# Patient Record
Sex: FEMALE | Race: WHITE | NOT HISPANIC OR LATINO | Employment: OTHER | URBAN - METROPOLITAN AREA
[De-identification: names, ages, dates, MRNs, and addresses within clinical notes are randomized per-mention and may not be internally consistent; named-entity substitution may affect disease eponyms.]

---

## 2017-01-30 ENCOUNTER — HOSPITAL ENCOUNTER (OUTPATIENT)
Dept: SLEEP MEDICINE | Facility: HOSPITAL | Age: 62
Discharge: HOME | End: 2017-01-30
Admitting: INTERNAL MEDICINE

## 2017-01-30 PROCEDURE — G0463 HOSPITAL OUTPT CLINIC VISIT: HCPCS

## 2017-01-31 NOTE — PROGRESS NOTES
DATE OF VISIT: 01/30/2017    REFERRING PHYSICIANS:  1. Marisela Pemberton MD  2. Donny Lubin MD    HISTORY OF PRESENT ILLNESS: This is a very pleasant 61-year-old female last seen in 2012 by Dr. Ewing. At that time, the patient had a negative sleep study. She is here because she feels her snoring has been worse for the past few years. She has been having more arousals and feeling unrested and tired in the morning. She has had in the past history of night terrors, which has now resolved pretty much. No parasomnias reported. She does feel tired, unrested and sleepy during the daytime. No sweating in her sleep. Occasional headaches. She also reports difficulty staying asleep at night with frequent awakenings. Sleep schedule: Time to bed at 9:30 to 10, gets up at 7, gets about 7 hours of sleep and feels sluggish. No alcohol abuse. No caffeine abuse.     PAST MEDICAL HISTORY:  1. Hypothyroidism.  2. Hypertension.     MEDICATIONS:  1. Prempro.  2. Synthroid.  3. Hypertension medication.     FAMILY HISTORY: Positive for breast cancer  and COPD.     SOCIAL HISTORY: Retired teacher. No tobacco abuse. Alcohol: One drink daily. Caffeinated beverages are 3 drinks daily.     REVIEW OF SYSTEMS: Positive for nasal congestion, postnasal drainage, irregular heartbeat, cough, dizziness, and depression. The rest of the 12-point review of systems is negative. [place] Sleepiness Scale Score is 9 out of 24, within normal limits.     PHYSICAL EXAMINATION:  GENERAL: Awake, alert, no distress.   VITAL SIGNS: Blood pressure 137/91, BMI 28, heart rate 65.   HEENT: Mallampati between III and IV.   LUNGS: Clear.   CARDIOVASCULAR: Regular rate and rhythm.   ABDOMEN: Benign.   EXTREMITIES: No edema.   CNS: No deficits.     IMPRESSION: Hypersomnia with comorbidities of hypertension.     RECOMMENDATIONS: I reviewed old sleep study with the patient. She had a low index with no significant desaturation at this time with worsening symptoms.  Recommend sleep study to reevaluate. Re-educated the patient on sleep apnea and sleep hygiene measures. The patient is agreeable, wishing to proceed for a split night sleep study. Sleep hygiene was discussed in detail. We will follow up post sleep study, discuss results and further management options.          Rony Fonseca M.D.  TS:[email or web address]  D:   01/30/2017 14:55:53  T:   01/31/2017 03:55:00  Job ID:   [id number]  Document ID:   [id number]  cc:   SARAH Celestin M.D.

## 2017-02-02 ENCOUNTER — TRANSCRIBE ORDERS (OUTPATIENT)
Dept: PULMONOLOGY | Facility: HOSPITAL | Age: 62
End: 2017-02-02

## 2017-02-02 DIAGNOSIS — G47.33 OSA (OBSTRUCTIVE SLEEP APNEA): Primary | ICD-10-CM

## 2017-02-03 ENCOUNTER — HOSPITAL ENCOUNTER (OUTPATIENT)
Dept: SLEEP MEDICINE | Facility: HOSPITAL | Age: 62
Discharge: HOME | End: 2017-02-03
Admitting: INTERNAL MEDICINE

## 2017-02-03 DIAGNOSIS — G47.33 OSA (OBSTRUCTIVE SLEEP APNEA): ICD-10-CM

## 2017-02-03 PROCEDURE — 95806 SLEEP STUDY UNATT&RESP EFFT: CPT

## 2017-02-20 ENCOUNTER — TELEPHONE (OUTPATIENT)
Dept: SLEEP MEDICINE | Facility: HOSPITAL | Age: 62
End: 2017-02-20

## 2017-02-20 NOTE — TELEPHONE ENCOUNTER
Spoke with pt.  Pap titration cx as pt's ins will not pay for in lab titration study.  Request for auto device placed in dr. sosa's to do pile.  Order to be faxed to [facility].

## 2017-02-21 ENCOUNTER — TELEPHONE (OUTPATIENT)
Dept: SLEEP MEDICINE | Facility: HOSPITAL | Age: 62
End: 2017-02-21

## 2017-02-21 NOTE — TELEPHONE ENCOUNTER
Spoke with pt.  Let pt know that  i requested  write the order for s/u from [facility].  Emailed que @ [facility] this morning.    Pt just called [facility] and requested to get dr. sosa to just write the order, she's wanting to purchase her device herself(not from a dme).    Pt states she's going to call [facility] to get an order from dr. sosa.

## 2017-02-22 ENCOUNTER — TELEPHONE (OUTPATIENT)
Dept: SLEEP MEDICINE | Facility: HOSPITAL | Age: 62
End: 2017-02-22

## 2017-02-22 NOTE — TELEPHONE ENCOUNTER
Spoke with pt.  Per pt she's going to  an order from [facility] on Monday(2/27/16).

## 2017-06-08 ENCOUNTER — APPOINTMENT (OUTPATIENT)
Dept: WOMENS IMAGING | Facility: HOSPITAL | Age: 62
End: 2017-06-08

## 2017-06-08 PROCEDURE — 77062 BREAST TOMOSYNTHESIS BI: CPT | Performed by: RADIOLOGY

## 2017-06-08 PROCEDURE — 77066 DX MAMMO INCL CAD BI: CPT | Performed by: RADIOLOGY

## 2017-06-15 ENCOUNTER — APPOINTMENT (OUTPATIENT)
Dept: WOMENS IMAGING | Facility: HOSPITAL | Age: 62
End: 2017-06-15

## 2017-06-15 PROCEDURE — 76641 ULTRASOUND BREAST COMPLETE: CPT | Performed by: RADIOLOGY

## 2018-08-10 ENCOUNTER — APPOINTMENT (OUTPATIENT)
Dept: WOMENS IMAGING | Facility: HOSPITAL | Age: 63
End: 2018-08-10

## 2018-08-10 PROCEDURE — 77067 SCR MAMMO BI INCL CAD: CPT | Performed by: RADIOLOGY

## 2018-08-10 PROCEDURE — 77063 BREAST TOMOSYNTHESIS BI: CPT | Performed by: RADIOLOGY

## 2019-08-15 ENCOUNTER — APPOINTMENT (OUTPATIENT)
Dept: WOMENS IMAGING | Facility: HOSPITAL | Age: 64
End: 2019-08-15

## 2019-08-15 PROCEDURE — 77063 BREAST TOMOSYNTHESIS BI: CPT | Performed by: RADIOLOGY

## 2019-08-15 PROCEDURE — 77067 SCR MAMMO BI INCL CAD: CPT | Performed by: RADIOLOGY

## 2020-11-17 ENCOUNTER — APPOINTMENT (OUTPATIENT)
Dept: WOMENS IMAGING | Facility: HOSPITAL | Age: 65
End: 2020-11-17

## 2020-11-17 PROCEDURE — 77067 SCR MAMMO BI INCL CAD: CPT | Performed by: RADIOLOGY

## 2020-11-17 PROCEDURE — 77063 BREAST TOMOSYNTHESIS BI: CPT | Performed by: RADIOLOGY

## 2021-07-22 ENCOUNTER — TRANSCRIBE ORDERS (OUTPATIENT)
Dept: ADMINISTRATIVE | Facility: HOSPITAL | Age: 66
End: 2021-07-22

## 2021-07-22 DIAGNOSIS — Z80.3 FAMILY HISTORY OF BREAST CANCER: Primary | ICD-10-CM

## 2021-08-18 ENCOUNTER — HOSPITAL ENCOUNTER (OUTPATIENT)
Dept: MRI IMAGING | Facility: HOSPITAL | Age: 66
Discharge: HOME | End: 2021-08-18
Admitting: OBSTETRICS & GYNECOLOGY

## 2021-08-18 DIAGNOSIS — Z80.3 FAMILY HISTORY OF BREAST CANCER: ICD-10-CM

## 2021-08-18 PROCEDURE — 82565 ASSAY OF CREATININE: CPT

## 2021-08-18 PROCEDURE — C8937 CAD BREAST MRI: HCPCS

## 2021-08-18 PROCEDURE — A9577 INJ MULTIHANCE: HCPCS | Performed by: OBSTETRICS & GYNECOLOGY

## 2021-08-18 PROCEDURE — C8908 MRI W/O FOL W/CONT, BREAST,: HCPCS

## 2021-08-18 PROCEDURE — 0 GADOBENATE DIMEGLUMINE 529 MG/ML SOLUTION: Performed by: OBSTETRICS & GYNECOLOGY

## 2021-08-18 RX ADMIN — GADOBENATE DIMEGLUMINE 13 ML: 529 INJECTION, SOLUTION INTRAVENOUS at 16:57

## 2021-08-20 ENCOUNTER — TRANSCRIBE ORDERS (OUTPATIENT)
Dept: ADMINISTRATIVE | Facility: HOSPITAL | Age: 66
End: 2021-08-20

## 2021-08-20 DIAGNOSIS — R92.8 ABNORMAL MRI, BREAST: Primary | ICD-10-CM

## 2021-08-20 LAB — CREAT BLDA-MCNC: 0.7 MG/DL (ref 0.6–1.3)

## 2021-08-25 ENCOUNTER — HOSPITAL ENCOUNTER (OUTPATIENT)
Dept: MAMMOGRAPHY | Facility: HOSPITAL | Age: 66
Discharge: HOME | End: 2021-08-25
Admitting: OBSTETRICS & GYNECOLOGY

## 2021-08-25 DIAGNOSIS — R92.8 ABNORMAL MRI, BREAST: ICD-10-CM

## 2021-08-25 PROCEDURE — G0279 TOMOSYNTHESIS, MAMMO: HCPCS

## 2021-08-25 PROCEDURE — 77065 DX MAMMO INCL CAD UNI: CPT

## 2021-09-09 ENCOUNTER — HOSPITAL ENCOUNTER (OUTPATIENT)
Dept: MAMMOGRAPHY | Facility: HOSPITAL | Age: 66
Discharge: HOME | End: 2021-09-09
Admitting: OBSTETRICS & GYNECOLOGY

## 2021-09-09 VITALS
BODY MASS INDEX: 29.23 KG/M2 | OXYGEN SATURATION: 98 % | WEIGHT: 145 LBS | HEIGHT: 59 IN | HEART RATE: 88 BPM | RESPIRATION RATE: 16 BRPM | DIASTOLIC BLOOD PRESSURE: 76 MMHG | TEMPERATURE: 97.8 F | SYSTOLIC BLOOD PRESSURE: 123 MMHG

## 2021-09-09 DIAGNOSIS — R92.8 ABNORMAL MAMMOGRAM: ICD-10-CM

## 2021-09-09 PROCEDURE — 25010000003 LIDOCAINE 1 % SOLUTION: Performed by: OBSTETRICS & GYNECOLOGY

## 2021-09-09 PROCEDURE — 88305 TISSUE EXAM BY PATHOLOGIST: CPT | Performed by: OBSTETRICS & GYNECOLOGY

## 2021-09-09 RX ORDER — DIAZEPAM 5 MG/1
5 TABLET ORAL ONCE AS NEEDED
Status: SHIPPED | OUTPATIENT
Start: 2021-09-09

## 2021-09-09 RX ORDER — LIDOCAINE HYDROCHLORIDE 10 MG/ML
27 INJECTION, SOLUTION INFILTRATION; PERINEURAL ONCE
Status: COMPLETED | OUTPATIENT
Start: 2021-09-09 | End: 2021-09-09

## 2021-09-09 RX ORDER — VILAZODONE HYDROCHLORIDE 20 MG/1
20 TABLET ORAL DAILY
Status: ON HOLD | COMMUNITY
End: 2022-06-13

## 2021-09-09 RX ADMIN — LIDOCAINE HYDROCHLORIDE 27 ML: 10; .005 INJECTION, SOLUTION EPIDURAL; INFILTRATION; INTRACAUDAL; PERINEURAL at 13:37

## 2021-09-09 RX ADMIN — LIDOCAINE HYDROCHLORIDE 27 ML: 10 INJECTION, SOLUTION INFILTRATION; PERINEURAL at 13:28

## 2021-09-09 RX ADMIN — LIDOCAINE HYDROCHLORIDE 1 ML: 10 INJECTION, SOLUTION INFILTRATION; PERINEURAL at 13:36

## 2021-09-09 NOTE — NURSING NOTE
Biopsy site to left medial upper breast clear with Dermabond dry and intact. No firmness or swelling noted at or around biopsy site. Denies pain but reports slight stinging sensation. Ice pack with protective covering applied to biopsy site. Discharge instructions discussed with understanding voiced by patient. Copies provided to patient. No distress noted. To home via private vehicle.

## 2021-09-09 NOTE — H&P
Name: Stephie Gerber ADMIT: (Not on file)   : 1955  PCP: Pauline Sarabia DO    MRN: [id number] LOS: 0 days   AGE/SEX: 65 y.o. female  ROOM: Room/bed info not found       Chief complaint left breast lesion on MRI with a mammographic correlate    Present Illness or Internal History:  Patient is a 65 y.o. female presents with a left breast lesion on MRI with a mammographic correlate.     Past Surgical History:  Past Surgical History:   Procedure Laterality Date   • APPENDECTOMY     • AUGMENTATION MAMMAPLASTY     • CHEST TUBE INSERTION     • COLONOSCOPY         Past Medical History:  Past Medical History:   Diagnosis Date   • Benign essential hypertension    • Hearing loss, bilateral    • Hyperlipidemia    • Hypothyroidism    • Moderate mitral regurgitation    • Seasonal allergic rhinitis    • Sleep apnea    • Spontaneous pneumothorax    • Weller syndrome    • Vertigo        Home Medications:  (Not in a hospital admission)      Allergies:  Neomycin-bacitracin zn-polymyx    Family History:  Family History   Problem Relation Age of Onset   • Breast cancer Mother    • Colon polyps Mother    • Heart attack Father    • Prostate cancer Father    • Liver cancer Brother    • Colon polyps Brother    • Colon polyps Brother    • Colon polyps Brother    • Colon polyps Brother    • Colon polyps Brother        Social History:  Social History     Tobacco Use   • Smoking status: Not on file   Substance Use Topics   • Alcohol use: Not on file   • Drug use: Not on file        Objective     Physical Exam:    No exam performed today,    Vital Signs  BP: ()/()   Arterial Line BP: ()/()     Anticipated Surgical Procedure:  tomosynthesis guided vacuum assisted left breast biopsy with clip placement    The risks, benefits and alternatives of this procedure have been discussed with the patient or responsible party: Yes        Hardy Zambrano Jr., MD  21  12:22 EDT

## 2021-09-10 LAB
CYTO UR: NORMAL
LAB AP CASE REPORT: NORMAL
LAB AP DIAGNOSIS COMMENT: NORMAL
PATH REPORT.FINAL DX SPEC: NORMAL
PATH REPORT.GROSS SPEC: NORMAL

## 2021-09-13 ENCOUNTER — HOSPITAL ENCOUNTER (OUTPATIENT)
Dept: MAMMOGRAPHY | Facility: HOSPITAL | Age: 66
End: 2021-09-13

## 2021-11-18 ENCOUNTER — TRANSCRIBE ORDERS (OUTPATIENT)
Dept: ADMINISTRATIVE | Facility: HOSPITAL | Age: 66
End: 2021-11-18

## 2021-11-18 DIAGNOSIS — N64.4 BREAST PAIN, RIGHT: Primary | ICD-10-CM

## 2021-11-18 DIAGNOSIS — N64.4 PAIN OF RIGHT BREAST: Primary | ICD-10-CM

## 2021-12-09 ENCOUNTER — HOSPITAL ENCOUNTER (OUTPATIENT)
Dept: ULTRASOUND IMAGING | Facility: HOSPITAL | Age: 66
Discharge: HOME | End: 2021-12-09

## 2021-12-09 ENCOUNTER — HOSPITAL ENCOUNTER (OUTPATIENT)
Dept: MAMMOGRAPHY | Facility: HOSPITAL | Age: 66
Discharge: HOME | End: 2021-12-09

## 2021-12-09 DIAGNOSIS — N64.4 PAIN OF RIGHT BREAST: ICD-10-CM

## 2021-12-09 DIAGNOSIS — N64.4 BREAST PAIN, RIGHT: ICD-10-CM

## 2021-12-09 PROCEDURE — 77065 DX MAMMO INCL CAD UNI: CPT

## 2021-12-09 PROCEDURE — G0279 TOMOSYNTHESIS, MAMMO: HCPCS

## 2021-12-09 PROCEDURE — 76642 ULTRASOUND BREAST LIMITED: CPT

## 2021-12-10 ENCOUNTER — TRANSCRIBE ORDERS (OUTPATIENT)
Dept: ADMINISTRATIVE | Facility: HOSPITAL | Age: 66
End: 2021-12-10

## 2021-12-10 DIAGNOSIS — R92.8 ABNORMAL ULTRASOUND OF BREAST: Primary | ICD-10-CM

## 2022-01-21 ENCOUNTER — HOSPITAL ENCOUNTER (OUTPATIENT)
Dept: ULTRASOUND IMAGING | Facility: HOSPITAL | Age: 67
Discharge: HOME | End: 2022-01-21
Admitting: OBSTETRICS & GYNECOLOGY

## 2022-01-21 DIAGNOSIS — R92.8 ABNORMAL ULTRASOUND OF BREAST: ICD-10-CM

## 2022-01-21 PROCEDURE — 76642 ULTRASOUND BREAST LIMITED: CPT

## 2022-03-30 ENCOUNTER — PREP FOR SURGERY (OUTPATIENT)
Dept: SURGERY | Facility: SURGERY CENTER | Age: 67
End: 2022-03-30

## 2022-03-30 DIAGNOSIS — Z83.71 FAMILY HX COLONIC POLYPS: ICD-10-CM

## 2022-03-30 DIAGNOSIS — Z86.010 HISTORY OF COLON POLYPS: Primary | ICD-10-CM

## 2022-03-30 PROBLEM — Z86.0100 HISTORY OF COLON POLYPS: Status: ACTIVE | Noted: 2022-03-30

## 2022-03-30 PROBLEM — Z83.719 FAMILY HX COLONIC POLYPS: Status: ACTIVE | Noted: 2022-03-30

## 2022-03-30 RX ORDER — SODIUM CHLORIDE, SODIUM LACTATE, POTASSIUM CHLORIDE, CALCIUM CHLORIDE 600; 310; 30; 20 MG/100ML; MG/100ML; MG/100ML; MG/100ML
30 INJECTION, SOLUTION INTRAVENOUS CONTINUOUS PRN
Status: CANCELLED | OUTPATIENT
Start: 2022-03-30

## 2022-03-30 RX ORDER — SODIUM CHLORIDE 0.9 % (FLUSH) 0.9 %
3 SYRINGE (ML) INJECTION EVERY 12 HOURS SCHEDULED
Status: CANCELLED | OUTPATIENT
Start: 2022-03-30

## 2022-03-30 RX ORDER — SODIUM CHLORIDE 0.9 % (FLUSH) 0.9 %
10 SYRINGE (ML) INJECTION AS NEEDED
Status: CANCELLED | OUTPATIENT
Start: 2022-03-30

## 2022-04-11 ENCOUNTER — PREP FOR SURGERY (OUTPATIENT)
Dept: SURGERY | Facility: SURGERY CENTER | Age: 67
End: 2022-04-11

## 2022-04-11 DIAGNOSIS — Z12.11 ENCOUNTER FOR SCREENING FOR MALIGNANT NEOPLASM OF COLON: Primary | ICD-10-CM

## 2022-04-11 DIAGNOSIS — Z83.71 FAMILY HISTORY OF COLONIC POLYPS: ICD-10-CM

## 2022-04-11 DIAGNOSIS — Z86.010 PERSONAL HISTORY OF COLONIC POLYPS: ICD-10-CM

## 2022-04-11 RX ORDER — SODIUM CHLORIDE, SODIUM LACTATE, POTASSIUM CHLORIDE, CALCIUM CHLORIDE 600; 310; 30; 20 MG/100ML; MG/100ML; MG/100ML; MG/100ML
30 INJECTION, SOLUTION INTRAVENOUS CONTINUOUS PRN
Status: CANCELLED | OUTPATIENT
Start: 2022-04-11

## 2022-06-08 NOTE — DISCHARGE INSTRUCTIONS
ENDOSCOPY - EGD/COLONOSCOPY       ADULT CARE DISCHARGE  INSTRUCTIONS     Symptoms you may temporarily experience:      Sore Throat     Hoarseness     Bloating/Cramping     Dizziness     IV Irritation/tenderness     Gas or Belching     Slight fever     Small amount of blood in vomit or stool       Call Your Doctor for the following Problems: ______________________     ________________     Fever of 101 degrees or higher       Sharp abdominal  pain     Red streak up the arm from the IV site     Severe cramping        Large amount of blood in stool or vomit       Instructions for the next 24 hours after your Procedure:     Adult supervision     Do NOT drink any alcohol      Do not work today     NO important decisions     DO NOT sign any legal documents     You may shower/ bathe       DO NOT  DRIVE or operate machinery     Resume normal activity tomorrow       Discharge  Diet:     Avoid spicy/ greasy foods     Avoid any food that will cause more gas or bloating       *** Seek IMMEDIATE medical attention and call 911 if you develop symptoms such as:     Chest pain     Shortness of breath     Severe bleeding

## 2022-06-13 ENCOUNTER — ANESTHESIA EVENT (OUTPATIENT)
Dept: SURGERY | Facility: SURGERY CENTER | Age: 67
End: 2022-06-13

## 2022-06-13 ENCOUNTER — ANESTHESIA (OUTPATIENT)
Dept: SURGERY | Facility: SURGERY CENTER | Age: 67
End: 2022-06-13

## 2022-06-13 ENCOUNTER — HOSPITAL ENCOUNTER (OUTPATIENT)
Facility: SURGERY CENTER | Age: 67
Setting detail: HOSPITAL OUTPATIENT SURGERY
Discharge: HOME | End: 2022-06-13
Attending: INTERNAL MEDICINE | Admitting: INTERNAL MEDICINE

## 2022-06-13 VITALS
HEIGHT: 60 IN | WEIGHT: 134.8 LBS | TEMPERATURE: 98 F | DIASTOLIC BLOOD PRESSURE: 76 MMHG | HEART RATE: 58 BPM | RESPIRATION RATE: 16 BRPM | BODY MASS INDEX: 26.46 KG/M2 | SYSTOLIC BLOOD PRESSURE: 112 MMHG | OXYGEN SATURATION: 99 %

## 2022-06-13 DIAGNOSIS — Z86.010 PERSONAL HISTORY OF COLONIC POLYPS: ICD-10-CM

## 2022-06-13 DIAGNOSIS — Z83.71 FAMILY HX COLONIC POLYPS: ICD-10-CM

## 2022-06-13 DIAGNOSIS — Z12.11 ENCOUNTER FOR SCREENING FOR MALIGNANT NEOPLASM OF COLON: ICD-10-CM

## 2022-06-13 DIAGNOSIS — Z86.010 HISTORY OF COLON POLYPS: ICD-10-CM

## 2022-06-13 DIAGNOSIS — Z83.71 FAMILY HISTORY OF COLONIC POLYPS: ICD-10-CM

## 2022-06-13 LAB — GLUCOSE BLDC GLUCOMTR-MCNC: 135 MG/DL (ref 70–130)

## 2022-06-13 PROCEDURE — 25010000002 PROPOFOL 10 MG/ML EMULSION: Performed by: ANESTHESIOLOGY

## 2022-06-13 PROCEDURE — 88305 TISSUE EXAM BY PATHOLOGIST: CPT | Performed by: INTERNAL MEDICINE

## 2022-06-13 PROCEDURE — 45380 COLONOSCOPY AND BIOPSY: CPT | Performed by: INTERNAL MEDICINE

## 2022-06-13 PROCEDURE — 0 LIDOCAINE 1 % SOLUTION: Performed by: ANESTHESIOLOGY

## 2022-06-13 PROCEDURE — 45385 COLONOSCOPY W/LESION REMOVAL: CPT | Performed by: INTERNAL MEDICINE

## 2022-06-13 RX ORDER — SODIUM CHLORIDE, SODIUM LACTATE, POTASSIUM CHLORIDE, CALCIUM CHLORIDE 600; 310; 30; 20 MG/100ML; MG/100ML; MG/100ML; MG/100ML
30 INJECTION, SOLUTION INTRAVENOUS CONTINUOUS PRN
Status: DISCONTINUED | OUTPATIENT
Start: 2022-06-13 | End: 2022-06-13 | Stop reason: HOSPADM

## 2022-06-13 RX ORDER — MAGNESIUM HYDROXIDE 1200 MG/15ML
LIQUID ORAL AS NEEDED
Status: DISCONTINUED | OUTPATIENT
Start: 2022-06-13 | End: 2022-06-13 | Stop reason: HOSPADM

## 2022-06-13 RX ORDER — SODIUM CHLORIDE 0.9 % (FLUSH) 0.9 %
3 SYRINGE (ML) INJECTION EVERY 12 HOURS SCHEDULED
Status: DISCONTINUED | OUTPATIENT
Start: 2022-06-13 | End: 2022-06-13 | Stop reason: HOSPADM

## 2022-06-13 RX ORDER — PROPOFOL 10 MG/ML
VIAL (ML) INTRAVENOUS AS NEEDED
Status: DISCONTINUED | OUTPATIENT
Start: 2022-06-13 | End: 2022-06-13 | Stop reason: SURG

## 2022-06-13 RX ORDER — LIDOCAINE HYDROCHLORIDE 10 MG/ML
INJECTION, SOLUTION INFILTRATION; PERINEURAL AS NEEDED
Status: DISCONTINUED | OUTPATIENT
Start: 2022-06-13 | End: 2022-06-13 | Stop reason: SURG

## 2022-06-13 RX ORDER — SODIUM CHLORIDE 0.9 % (FLUSH) 0.9 %
10 SYRINGE (ML) INJECTION AS NEEDED
Status: DISCONTINUED | OUTPATIENT
Start: 2022-06-13 | End: 2022-06-13 | Stop reason: HOSPADM

## 2022-06-13 RX ADMIN — PROPOFOL 140 MCG/KG/MIN: 10 INJECTION, EMULSION INTRAVENOUS at 10:28

## 2022-06-13 RX ADMIN — PROPOFOL 30 MG: 10 INJECTION, EMULSION INTRAVENOUS at 10:39

## 2022-06-13 RX ADMIN — PROPOFOL 30 MG: 10 INJECTION, EMULSION INTRAVENOUS at 10:52

## 2022-06-13 RX ADMIN — SODIUM CHLORIDE, POTASSIUM CHLORIDE, SODIUM LACTATE AND CALCIUM CHLORIDE 30 ML/HR: 600; 310; 30; 20 INJECTION, SOLUTION INTRAVENOUS at 10:06

## 2022-06-13 RX ADMIN — LIDOCAINE HYDROCHLORIDE 30 MG: 10 INJECTION, SOLUTION INFILTRATION; PERINEURAL at 10:28

## 2022-06-13 RX ADMIN — PROPOFOL 100 MG: 10 INJECTION, EMULSION INTRAVENOUS at 10:28

## 2022-06-13 NOTE — ANESTHESIA PREPROCEDURE EVALUATION
Anesthesia Evaluation     Patient summary reviewed and Nursing notes reviewed   NPO Solid Status: > 8 hours  NPO Liquid Status: > 2 hours           Airway   Mallampati: II  TM distance: >3 FB  Neck ROM: full  No difficulty expected  Dental - normal exam     Pulmonary - normal exam   (+) sleep apnea on CPAP,   Cardiovascular - normal exam  Exercise tolerance: good (4-7 METS)    ECG reviewed    (+) hypertension, valvular problems/murmurs AI, CAD, hyperlipidemia,       Neuro/Psych  (+) headaches, dizziness/light headedness, psychiatric history Anxiety and Depression,    GI/Hepatic/Renal/Endo    (+)  GERD,  diabetes mellitus type 2, thyroid problem hypothyroidism    Musculoskeletal (-) negative ROS    Abdominal    Substance History - negative use     OB/GYN          Other - negative ROS                       Anesthesia Plan    ASA 3     MAC     intravenous induction     Anesthetic plan, risks, benefits, and alternatives have been provided, discussed and informed consent has been obtained with: patient.        CODE STATUS:

## 2022-06-13 NOTE — ANESTHESIA POSTPROCEDURE EVALUATION
"Patient: Stephie Gerber    Procedure Summary     Date: 06/13/22 Room / Location: [facility]    Anesthesia Start: 1023 Anesthesia Stop: 1101    Procedure: COLONOSCOPY FOR SCREENING WITH POLYPECTOMY (N/A ) Diagnosis:       Encounter for screening for malignant neoplasm of colon      Personal history of colonic polyps      Family history of colonic polyps      (Encounter for screening for malignant neoplasm of colon [Z12.11])      (Personal history of colonic polyps [Z86.010])      (Family history of colonic polyps [Z83.71])    Surgeons: Jose Rafael Means MD Provider: Gabino Layne MD    Anesthesia Type: MAC ASA Status: 3          Anesthesia Type: MAC    Vitals  Vitals Value Taken Time   /76 06/13/22 1100   Temp 36.7 °C (98 °F) 06/13/22 1100   Pulse 71 06/13/22 1100   Resp 16 06/13/22 1100   SpO2 72 % 06/13/22 1100           Post Anesthesia Care and Evaluation    Patient location during evaluation: bedside  Patient participation: complete - patient participated  Level of consciousness: awake and alert  Pain score: 0  Pain management: adequate    Airway patency: patent  Anesthetic complications: No anesthetic complications  PONV Status: none  Cardiovascular status: acceptable  Respiratory status: acceptable  Hydration status: acceptable    Comments: /76 (BP Location: Left arm, Patient Position: Lying)   Pulse 71   Temp 36.7 °C (98 °F) (Temporal)   Resp 16   Ht 151.1 cm (59.5\")   Wt 61.1 kg (134 lb 12.8 oz)   SpO2 (!) 72%   BMI 26.77 kg/m²         "

## 2022-06-13 NOTE — H&P
No chief complaint on file.      HPI  Patient today for screening colonoscopy.  She has a history of colon polyps.         Problem List:    Patient Active Problem List   Diagnosis   • History of colon polyps   • Family hx colonic polyps   • Encounter for screening for malignant neoplasm of colon       Medical History:    Past Medical History:   Diagnosis Date   • Acid reflux disease    • Acute stress reaction    • Agatston CAC score 100-199    • ALT (SGPT) level raised    • Angular cheilitis    • Benign essential hypertension    • Bowel incontinence     Patient reports onset 6 months ago of intermittent loss of bowel without any change in diet, medication, or GI regiment. She denies any hemature or melena. Patient reports having to soil her clothing when ever she has this issue. Patient denises any stool softeners or OTC medications for GI. Patient denises any nausea or vomiting.   • BPPV (benign paroxysmal positional vertigo)    • Breast cancer screening    • Colon polyps     tubular adenoma   • Depression    • Diabetes mellitus (HCC)    • Dizziness    • Elevated AST (SGOT)    • Encounter to establish care with new doctor    • Fatigue     improved, reviewed natural supplements that patient started in Dec 2018.   • Fever and chills    • Flu vaccine need    • Functional murmur    • Grief    • Headache    • Hearing loss, bilateral    • High blood pressure    • High cholesterol    • History of colon polyps    • Hormone replacement therapy    • Hypercholesteremia    • Hyperlipidemia    • Hypothyroidism    • Initial Medicare annual wellness visit    • Medication management    • Medication refill    • Menopause    • Migraine headache    • Mitral valve insufficiency and aortic valve insufficiency     echo 4/4/15   • Moderate aortic regurgitation    • Moderate mitral regurgitation    • Need for influenza vaccination    • Need for shingles vaccine    • Need for vaccination against Streptococcus pneumoniae using pneumococcal  conjugate vaccine 13    • Obstructive sleep apnea    • Overweight (BMI 25.0-29.9)    • Pneumothorax    • Polyp of sigmoid colon    • Rash    • Seasonal allergic rhinitis    • Sleep apnea    • Spontaneous pneumothorax    • Subcutaneous nodule    • Weller syndrome    • Upper respiratory infection    • Vertigo         Social History:    Social History     Socioeconomic History   • Marital status:    Tobacco Use   • Smoking status: Never Smoker   Substance and Sexual Activity   • Alcohol use: Yes     Alcohol/week: 2.0 standard drinks     Types: 2 Glasses of wine per week     Comment: 2 glasses of wine a week   • Drug use: Never       Family History:   Family History   Problem Relation Age of Onset   • Breast cancer Mother    • Colon polyps Mother    • Heart attack Father    • Prostate cancer Father    • Liver cancer Brother    • Colon polyps Brother    • Colon polyps Brother    • Colon polyps Brother    • Colon polyps Brother    • Colon polyps Brother    • Depression Other    • Alcohol abuse Other    • COPD Other    • Hyperlipidemia Other    • Arthritis Other    • Stroke Other    • Emphysema Other    • Pancreatic cancer Other        Surgical History:   Past Surgical History:   Procedure Laterality Date   • APPENDECTOMY     • AUGMENTATION MAMMAPLASTY     • BREAST AUGMENTATION     • BREAST SURGERY      enlargement procedure   • CHEST TUBE INSERTION     • COLONOSCOPY           Current Facility-Administered Medications:   •  lactated ringers infusion, 30 mL/hr, Intravenous, Continuous PRN, Jose Rafael Means MD  •  sodium chloride 0.9 % flush 10 mL, 10 mL, Intravenous, PRN, Jose Rafael Means MD    Facility-Administered Medications Ordered in Other Encounters:   •  diazePAM (VALIUM) tablet 5 mg, 5 mg, Oral, Once PRN, Carla Rojas MD    Allergies:   Allergies   Allergen Reactions   • Neomycin-Bacitracin Zn-Polymyx Rash        The following portions of the patient's history were reviewed by me and updated as  appropriate: review of systems, allergies, current medications, past family history, past medical history, past social history, past surgical history and problem list.    There were no vitals filed for this visit.    PHYSICAL EXAM:    CONSTITUTIONAL:  today's vital signs reviewed by me  GASTROINTESTINAL: abdomen is soft nontender nondistended with normal active bowel sounds, no masses are appreciated    Assessment/ Plan  We will proceed with a screening colonoscopy.    Risks and benefits as well as alternatives to endoscopic evaluation were explained to the patient and they voiced understanding and wish to proceed.  These risks include but are not limited to the risk of bleeding, perforation, adverse reaction to sedation, and missed lesions.  The patient was given the opportunity to ask questions prior to the endoscopic procedure.

## 2022-06-14 LAB
LAB AP CASE REPORT: NORMAL
LAB AP CLINICAL INFORMATION: NORMAL
PATH REPORT.FINAL DX SPEC: NORMAL
PATH REPORT.GROSS SPEC: NORMAL

## 2022-06-15 ENCOUNTER — HOSPITAL ENCOUNTER (EMERGENCY)
Facility: HOSPITAL | Age: 67
Discharge: HOME | End: 2022-06-15
Attending: EMERGENCY MEDICINE | Admitting: EMERGENCY MEDICINE

## 2022-06-15 VITALS
RESPIRATION RATE: 18 BRPM | OXYGEN SATURATION: 97 % | DIASTOLIC BLOOD PRESSURE: 84 MMHG | SYSTOLIC BLOOD PRESSURE: 119 MMHG | TEMPERATURE: 98.8 F | HEART RATE: 70 BPM

## 2022-06-15 DIAGNOSIS — E03.9 HYPOTHYROIDISM, UNSPECIFIED TYPE: ICD-10-CM

## 2022-06-15 DIAGNOSIS — E78.5 HYPERLIPIDEMIA, UNSPECIFIED HYPERLIPIDEMIA TYPE: ICD-10-CM

## 2022-06-15 DIAGNOSIS — S60.221A TRAUMATIC ECCHYMOSIS OF RIGHT HAND, INITIAL ENCOUNTER: ICD-10-CM

## 2022-06-15 DIAGNOSIS — I10 HYPERTENSION, UNSPECIFIED TYPE: ICD-10-CM

## 2022-06-15 DIAGNOSIS — Z87.09 HISTORY OF ASTHMA: ICD-10-CM

## 2022-06-15 DIAGNOSIS — M79.641 RIGHT HAND PAIN: Primary | ICD-10-CM

## 2022-06-15 DIAGNOSIS — E11.9 TYPE 2 DIABETES MELLITUS WITHOUT COMPLICATION, WITHOUT LONG-TERM CURRENT USE OF INSULIN: ICD-10-CM

## 2022-06-15 PROCEDURE — 99283 EMERGENCY DEPT VISIT LOW MDM: CPT

## 2022-06-15 RX ORDER — DICLOFENAC SODIUM 75 MG/1
75 TABLET, DELAYED RELEASE ORAL 2 TIMES DAILY
Qty: 20 TABLET | Refills: 0 | Status: SHIPPED | OUTPATIENT
Start: 2022-06-15 | End: 2022-06-15 | Stop reason: SDUPTHER

## 2022-06-15 RX ORDER — DICLOFENAC SODIUM 75 MG/1
75 TABLET, DELAYED RELEASE ORAL 2 TIMES DAILY
Qty: 20 TABLET | Refills: 0 | Status: SHIPPED | OUTPATIENT
Start: 2022-06-15

## 2022-06-15 RX ORDER — HYDROCODONE BITARTRATE AND ACETAMINOPHEN 5; 325 MG/1; MG/1
1 TABLET ORAL ONCE
Status: COMPLETED | OUTPATIENT
Start: 2022-06-15 | End: 2022-06-15

## 2022-06-15 RX ADMIN — HYDROCODONE BITARTRATE AND ACETAMINOPHEN 1 TABLET: 5; 325 TABLET ORAL at 22:40

## 2022-06-16 NOTE — ED PROVIDER NOTES
EMERGENCY DEPARTMENT ENCOUNTER    Room Number:  05/05  Date of encounter:  6/15/2022  PCP: Pauline Sarabia DO  Historian: Patient      HPI:  Chief Complaint: Right hand pain  A complete HPI/ROS/PMH/PSH/SH/FH are unobtainable due to: N/A    Context: Stephie Gerber is a 66 y.o. RHD female with past medical history of HTN, HLD, T2DM, asthma and hypothyroidism who presents to the ED c/o right hand pain and bruising.  Patient states that she had a colonoscopy on Monday and the nurse attempted to 3 times to obtain an IV in the right thumb without success.  Patient now has increased pain and bruising to the right thumb, hand, and wrist.  Pain is worse with movement and palpation, has taken Aleve and ibuprofen without relief.  Denies any chest pain, shortness of breath, or swelling.      PAST MEDICAL HISTORY  Active Ambulatory Problems     Diagnosis Date Noted   • History of colon polyps 03/30/2022   • Family hx colonic polyps 03/30/2022   • Encounter for screening for malignant neoplasm of colon 03/30/2022     Resolved Ambulatory Problems     Diagnosis Date Noted   • No Resolved Ambulatory Problems     Past Medical History:   Diagnosis Date   • Acid reflux disease    • Agatston CAC score 100-199    • ALT (SGPT) level raised    • Angular cheilitis    • Benign essential hypertension    • Bowel incontinence    • BPPV (benign paroxysmal positional vertigo)    • Colon polyps    • Depression    • Diabetes mellitus (HCC)    • Dizziness    • Elevated AST (SGOT)    • Fever and chills    • Functional murmur    • Hearing loss, bilateral    • High blood pressure    • High cholesterol    • Hormone replacement therapy    • Hypercholesteremia    • Hypothyroidism    • Menopause    • Migraine headache    • Mitral valve insufficiency and aortic valve insufficiency    • Obstructive sleep apnea    • Overweight (BMI 25.0-29.9)    • Seasonal allergic rhinitis    • Sleep apnea    • Spontaneous pneumothorax    • Subcutaneous nodule    •  Subjective   Patient ID: Michelle is a 61 year old female.    Chief Complaint   Patient presents with   • Sinus Problem     Sinus pain, pressure, and headache     Patient presents the office today with ongoing issues of head congestion and not feeling well.  She is had symptoms now for over a month.  She presented with viral URI issues back in December.  She is been trying to treat this over-the-counter with Tylenol, Flonase and saline spray.  Not making any progress.  She describes quite a bit of pressure behind the right eye and over the right maxillary sinus.  Postnasal drainage present.  She is had low-grade fevers.  Minimal cough.  Headache.  No rashes.  She is on a number of immune suppressing medicines.      Patient's medications, allergies, past medical, surgical, social and family histories were reviewed and updated as appropriate.    Review of Systems   Constitutional: Positive for chills, fatigue and fever.   HENT: Positive for congestion, ear pain, postnasal drip, rhinorrhea, sinus pressure and sinus pain. Negative for ear discharge, facial swelling, hearing loss, sneezing, sore throat and tinnitus.    Eyes: Negative for discharge.   Respiratory: Negative for cough, shortness of breath and wheezing.    Cardiovascular: Negative for chest pain.   Gastrointestinal: Positive for nausea. Negative for diarrhea and vomiting.   Musculoskeletal: Negative for arthralgias, myalgias, neck pain and neck stiffness.   Skin: Negative for pallor and rash.   Neurological: Positive for headaches. Negative for dizziness.   Hematological: Negative for adenopathy.   Psychiatric/Behavioral: Negative for sleep disturbance.       Objective   Physical Exam  Vitals signs and nursing note reviewed.   Constitutional:       Appearance: Normal appearance. She is well-developed. She is not ill-appearing.   HENT:      Head: Normocephalic and atraumatic.      Right Ear: Tympanic membrane, ear canal and external ear normal.      Left Ear:  Tympanic membrane, ear canal and external ear normal.      Nose: Mucosal edema, congestion and rhinorrhea present.      Right Sinus: Maxillary sinus tenderness present. No frontal sinus tenderness.      Left Sinus: No maxillary sinus tenderness or frontal sinus tenderness.      Mouth/Throat:      Mouth: Mucous membranes are moist.      Pharynx: Oropharynx is clear. No oropharyngeal exudate or posterior oropharyngeal erythema.   Eyes:      Conjunctiva/sclera: Conjunctivae normal.      Pupils: Pupils are equal, round, and reactive to light.   Neck:      Musculoskeletal: Normal range of motion and neck supple.      Thyroid: No thyromegaly or thyroid tenderness.   Cardiovascular:      Rate and Rhythm: Normal rate and regular rhythm.      Heart sounds: Normal heart sounds. No murmur.   Pulmonary:      Effort: Pulmonary effort is normal.      Breath sounds: Normal breath sounds.   Abdominal:      General: Bowel sounds are normal.      Palpations: Abdomen is soft.   Musculoskeletal: Normal range of motion.      Right lower leg: No edema.      Left lower leg: No edema.   Lymphadenopathy:      Cervical: Cervical adenopathy present.      Right cervical: Superficial cervical adenopathy present.      Left cervical: Superficial cervical adenopathy present.   Skin:     General: Skin is warm and dry.      Capillary Refill: Capillary refill takes less than 2 seconds.   Neurological:      General: No focal deficit present.      Mental Status: She is alert and oriented to person, place, and time. Mental status is at baseline.   Psychiatric:         Mood and Affect: Mood normal.         Behavior: Behavior normal.         Thought Content: Thought content normal.         Judgment: Judgment normal.         Assessment   Problem List Items Addressed This Visit        Respiratory    Acute non-recurrent pansinusitis - Primary     Patient uncertain of penicillin allergy but she is certain it was not an anaphylaxis issue.  Would be fine to go  Weller syndrome    • Upper respiratory infection          PAST SURGICAL HISTORY  Past Surgical History:   Procedure Laterality Date   • APPENDECTOMY     • AUGMENTATION MAMMAPLASTY     • BREAST AUGMENTATION     • BREAST SURGERY      enlargement procedure   • CHEST TUBE INSERTION     • COLONOSCOPY     • COLONOSCOPY N/A 6/13/2022    Procedure: COLONOSCOPY FOR SCREENING WITH POLYPECTOMY;  Surgeon: Jose Rafael Means MD;  Location: [facility] MAIN OR;  Service: Gastroenterology;  Laterality: N/A;  polyps         FAMILY HISTORY  Family History   Problem Relation Age of Onset   • Breast cancer Mother    • Colon polyps Mother    • Heart attack Father    • Prostate cancer Father    • Liver cancer Brother    • Colon polyps Brother    • Colon polyps Brother    • Colon polyps Brother    • Colon polyps Brother    • Colon polyps Brother    • Depression Other    • Alcohol abuse Other    • COPD Other    • Hyperlipidemia Other    • Arthritis Other    • Stroke Other    • Emphysema Other    • Pancreatic cancer Other          SOCIAL HISTORY  Social History     Socioeconomic History   • Marital status:    Tobacco Use   • Smoking status: Never Smoker   • Smokeless tobacco: Never Used   Substance and Sexual Activity   • Alcohol use: Yes     Alcohol/week: 2.0 standard drinks     Types: 2 Glasses of wine per week     Comment: 2 glasses of wine a week   • Drug use: Never         ALLERGIES  Neomycin-bacitracin zn-polymyx        REVIEW OF SYSTEMS  Review of Systems     All systems reviewed and negative except for those discussed in HPI.       PHYSICAL EXAM    I have reviewed the triage vital signs and nursing notes.    ED Triage Vitals [06/15/22 2137]   Temp Heart Rate Resp BP SpO2   98.8 °F (37.1 °C) 74 18 -- 96 %      Temp src Heart Rate Source Patient Position BP Location FiO2 (%)   Tympanic Monitor -- -- --       Physical Exam  GENERAL: Alert and orient x3, not distressed  HENT: nares patent  EYES: no scleral icterus  CV: regular  rhythm, regular rate  RESPIRATORY: normal effort  MUSCULOSKELETAL: no deformity, normal ROM, no bony tenderness  NEURO: alert, moves all extremities, follows commands  SKIN: warm, dry, ecchymosis over the right thumb and radial aspect of the right hand and wrist        LAB RESULTS  No results found for this or any previous visit (from the past 24 hour(s)).    Ordered the above labs and independently reviewed the results.        RADIOLOGY  No Radiology Exams Resulted Within Past 24 Hours    I ordered the above noted radiological studies. Reviewed by me and discussed with radiologist.  See dictation for official radiology interpretation.      PROCEDURES    Procedures      MEDICATIONS GIVEN IN ER    Medications   HYDROcodone-acetaminophen (NORCO) 5-325 MG per tablet 1 tablet (1 tablet Oral Given 6/15/22 2240)         PROGRESS, DATA ANALYSIS, CONSULTS, AND MEDICAL DECISION MAKING    All labs have been independently reviewed by me.  All radiology studies have been reviewed by me and discussed with radiologist dictating the report.   EKG's independently viewed and interpreted by me.  Discussion below represents my analysis of pertinent findings related to patient's condition, differential diagnosis, treatment plan and final disposition.    DDx: Includes but not limited to superficial thrombophlebitis, bruising, DVT         MDM: Patient's exam is consistent with ecchymosis after a failed IV attempt, there is no evidence for DVT, SVT, no palpable cords, no bony tenderness and there was no other trauma.  Vital signs are stable, patient is safe for discharge home with local care.    PPE: The patient wore a surgical mask throughout the entire patient encounter. I wore an N95.    AS OF 23:28 EDT VITALS:    BP - 119/84  HR - 70  TEMP - 98.8 °F (37.1 °C) (Tympanic)  O2 SATS - 97%        DIAGNOSIS  Final diagnoses:   Right hand pain   Traumatic ecchymosis of right hand, initial encounter   Hypertension, unspecified type  with a second generation cephalosporin.  She was given cefuroxime 500 mg twice daily for a total of 10 days.  Side effects discussed.  Continue with Flonase 2 sprays each nostril daily and use as needed saline nasal spray.  Tylenol as needed.         Relevant Medications    cefUROXime (CEFTIN) 500 MG tablet           Hyperlipidemia, unspecified hyperlipidemia type   Type 2 diabetes mellitus without complication, without long-term current use of insulin (HCC)   History of asthma   Hypothyroidism, unspecified type         DISPOSITION  DISCHARGE    Patient discharged in stable condition.    Reviewed implications of results, diagnosis, meds, responsibility to follow up, warning signs and symptoms of possible worsening, potential complications and reasons to return to ER.    Patient/Family voiced understanding of above instructions.    Discussed plan for discharge, as there is no emergent indication for admission. Patient referred to primary care provider for BP management due to today's BP. Pt/family is agreeable and understands need for follow up and repeat testing.  Pt is aware that discharge does not mean that nothing is wrong but it indicates no emergency is present that requires admission and they must continue care with follow-up as given below or physician of their choice.     FOLLOW-UP  Pauline Sarabia DO  [address] [id number] [phone]    Schedule an appointment as soon as possible for a visit in 3 days           Medication List      New Prescriptions    diclofenac 75 MG EC tablet  Commonly known as: VOLTAREN  Take 1 tablet by mouth 2 (Two) Times a Day.           Where to Get Your Medications      These medications were sent to [facility]/pharmacy #[id number] - [place], [place] - [address] AT [facility] - [phone] [facility] [phone]   [address], [place] [id number]    Phone: [phone]   · diclofenac 75 MG EC tablet                  Wilton Romo PA  06/15/22 7192

## 2022-06-16 NOTE — DISCHARGE INSTRUCTIONS
Home, rest, medicine as directed, apply warm compresses.  Home medicine as prescribed, follow up with PCP for recheck. Return to care with further concerns.

## 2022-06-16 NOTE — ED TRIAGE NOTES
Pt c/o right hand and wrist pain that started today. Pt had iv to area on Monday for colonoscopy. Pt has bruising to area    Pt wearing mask on arrival. Staff wearing mask and goggles at time of triage.     
no difficulties

## 2022-06-16 NOTE — ED PROVIDER NOTES
MD ATTESTATION NOTE    The CARRI and I have discussed this patient's history, physical exam, and treatment plan.  I have reviewed the documentation and personally had a face to face interaction with the patient. I affirm the documentation and agree with the treatment and plan.  The attached note describes my personal findings.      I provided a substantive portion of the care of the patient.  I personally performed the physical exam in its entirety, and below are my findings.  For this patient encounter, the patient wore surgical mask, I wore full protective PPE including N95 and eye protection.      Brief HPI: Pain and bruising on the right hand at the site of a recent venipuncture.  Patient had a colonoscopy 2 days ago and has developed worsening pain and bruising at the base of the left thumb where she had a failed IV insertion    PHYSICAL EXAM  ED Triage Vitals [06/15/22 2137]   Temp Heart Rate Resp BP SpO2   98.8 °F (37.1 °C) 74 18 -- 96 %      Temp src Heart Rate Source Patient Position BP Location FiO2 (%)   Tympanic Monitor -- -- --         GENERAL: no acute distress  HENT: nares patent  EYES: no scleral icterus  CV: regular rhythm, normal rate  RESPIRATORY: normal effort  ABDOMEN: soft  MUSCULOSKELETAL: no deformity.  She has very prominent veins throughout her hand and arm, and it is clear that the one at the base of her thumb was used for failed IV insertion.  There is some surrounding ecchymosis and tenderness, however the vein is not hard.  It is easily compressible.  There is no sign of a sending inflammatory change.  NEURO: alert, moves all extremities, follows commands  PSYCH:  calm, cooperative  SKIN: warm, dry    Vital signs and nursing notes reviewed.        Plan: Reassured the patient that this is normal after failed insertion site and that she should continue using warm compresses and over-the-counter pain reliever.  We discussed at length and answered all of her questions.  I advised her that  this is not a blood clot that she needs to be concerned about and that it will resolve in a few days     Donny Hernandes MD  06/15/22 8741

## 2023-02-27 ENCOUNTER — PREP FOR SURGERY (OUTPATIENT)
Dept: SURGERY | Facility: SURGERY CENTER | Age: 68
End: 2023-02-27
Payer: MEDICARE

## 2023-02-27 DIAGNOSIS — Z86.010 PERSONAL HISTORY OF COLONIC POLYPS: ICD-10-CM

## 2023-02-27 DIAGNOSIS — Z12.11 ENCOUNTER FOR SCREENING FOR MALIGNANT NEOPLASM OF COLON: Primary | ICD-10-CM

## 2023-02-27 RX ORDER — SODIUM CHLORIDE, SODIUM LACTATE, POTASSIUM CHLORIDE, CALCIUM CHLORIDE 600; 310; 30; 20 MG/100ML; MG/100ML; MG/100ML; MG/100ML
30 INJECTION, SOLUTION INTRAVENOUS CONTINUOUS PRN
OUTPATIENT
Start: 2023-02-27

## 2023-10-18 ENCOUNTER — OFFICE VISIT (OUTPATIENT)
Dept: GASTROENTEROLOGY | Facility: CLINIC | Age: 68
End: 2023-10-18
Payer: MEDICARE

## 2023-10-18 VITALS
DIASTOLIC BLOOD PRESSURE: 78 MMHG | BODY MASS INDEX: 28.87 KG/M2 | OXYGEN SATURATION: 97 % | TEMPERATURE: 98.4 F | WEIGHT: 143.2 LBS | HEART RATE: 74 BPM | SYSTOLIC BLOOD PRESSURE: 124 MMHG | HEIGHT: 59 IN

## 2023-10-18 DIAGNOSIS — R10.84 GENERALIZED ABDOMINAL PAIN: ICD-10-CM

## 2023-10-18 DIAGNOSIS — Z80.0 FAMILY HISTORY OF MALIGNANT NEOPLASM OF PANCREAS: ICD-10-CM

## 2023-10-18 DIAGNOSIS — Z80.0: ICD-10-CM

## 2023-10-18 DIAGNOSIS — K86.9 PANCREATIC LESION: Primary | ICD-10-CM

## 2023-10-18 DIAGNOSIS — R19.4 CHANGE IN BOWEL HABITS: ICD-10-CM

## 2023-10-18 DIAGNOSIS — D37.8 NEOPLASM OF UNCERTAIN BEHAVIOR OF OTHER SPECIFIED DIGESTIVE ORGANS: ICD-10-CM

## 2023-10-18 PROCEDURE — 1159F MED LIST DOCD IN RCRD: CPT | Performed by: NURSE PRACTITIONER

## 2023-10-18 PROCEDURE — 99214 OFFICE O/P EST MOD 30 MIN: CPT | Performed by: NURSE PRACTITIONER

## 2023-10-18 PROCEDURE — 1160F RVW MEDS BY RX/DR IN RCRD: CPT | Performed by: NURSE PRACTITIONER

## 2023-10-18 RX ORDER — CLOBETASOL PROPIONATE 0.5 MG/G
CREAM TOPICAL
COMMUNITY
Start: 2023-10-09

## 2023-10-18 RX ORDER — PROGESTERONE 100 MG/1
100 CAPSULE ORAL DAILY
COMMUNITY
Start: 2023-04-27

## 2023-10-18 RX ORDER — VITAMIN B COMPLEX
1 CAPSULE ORAL DAILY
COMMUNITY

## 2023-10-18 RX ORDER — TRIAMCINOLONE ACETONIDE 0.25 MG/G
CREAM TOPICAL
COMMUNITY
Start: 2023-10-09

## 2023-10-18 RX ORDER — LEVOTHYROXINE SODIUM 112 UG/1
112 TABLET ORAL DAILY
COMMUNITY

## 2023-10-18 RX ORDER — AZELASTINE HYDROCHLORIDE 137 UG/1
2 SPRAY, METERED NASAL 2 TIMES DAILY
COMMUNITY
Start: 2023-06-09

## 2023-10-18 RX ORDER — PREDNISONE 10 MG/1
TABLET ORAL
COMMUNITY
Start: 2023-10-09

## 2023-10-18 RX ORDER — DIPHENOXYLATE HYDROCHLORIDE AND ATROPINE SULFATE 2.5; .025 MG/1; MG/1
1 TABLET ORAL DAILY
COMMUNITY

## 2023-10-18 RX ORDER — CYANOCOBALAMIN (VITAMIN B-12) 500 MCG
TABLET ORAL
COMMUNITY
Start: 2023-05-16

## 2023-10-18 RX ORDER — KETOCONAZOLE 20 MG/G
CREAM TOPICAL
COMMUNITY
Start: 2023-10-02

## 2023-10-18 RX ORDER — PANTOPRAZOLE SODIUM 40 MG/1
TABLET, DELAYED RELEASE ORAL
COMMUNITY

## 2023-10-18 RX ORDER — VALSARTAN 80 MG/1
80 TABLET ORAL DAILY
COMMUNITY

## 2023-10-18 RX ORDER — PRAVASTATIN SODIUM 20 MG
20 TABLET ORAL DAILY
COMMUNITY
Start: 2023-07-19

## 2023-10-18 NOTE — PATIENT INSTRUCTIONS
Schedule MRI of the abdomen pancreatic protocol for further evaluation of abnormal CT scan given significant family history of pancreatic and liver cancer in first-degree relative.    Scheduling of your Ordered Diagnostic Tests :    A team member from [facility] scheduling department will contact you to schedule your tests.    You can also contact them directly at [phone].     Proceed with blood work, orders placed for CA 19-9    Restart fiber supplement to help regulate bowel movements, keep track of bowel pattern and dose of fiber and adjust to help promote more complete and regular evacuation.    Colon cancer screening up-to-date, due for colonoscopy June 2026    Additional recommendations will be made based on results of the above work-up.

## 2023-10-18 NOTE — PROGRESS NOTES
"Chief Complaint   Patient presents with    Follow-up     Abnormal CT scan and abdominal pain with diarrhea           History of Present Illness  67-year-old female presents today for follow-up.  She was a previous patient of Dr. Means's last colonoscopy June 20, 2023 recommended for repeat in 3 years given history of colon polyps.    Patient presents today with abdominal pain, it started diffusely with bloating, inconsistent bowel movements that alternate between diarrhea and constipation, pain is typically worse prior to bowel movement and will improve after bowel movement.    Family history of pancreatic cancer and liver cancer.  Recent CT scan with pancreatic cyst.  She has had previous liver biopsy in 2004 due to elevated liver function test, patient reports this was normal.  She also has diagnosis of Weller syndrome.      Result Review :       COMPREHENSIVE METABOLIC PANEL (09/07/2023 08:53)  Tissue Pathology Exam (06/20/2023 08:59)  COLONOSCOPY (06/20/2023 08:30)  Vital Signs:   /78   Pulse 74   Temp 98.4 °F (36.9 °C)   Ht 149.9 cm (59\")   Wt 65 kg (143 lb 3.2 oz)   SpO2 97%   BMI 28.92 kg/m²     Body mass index is 28.92 kg/m².     Physical Exam  Vitals reviewed.   Constitutional:       General: She is not in acute distress.     Appearance: Normal appearance. She is well-developed. She is not diaphoretic.   HENT:      Head: Normocephalic and atraumatic.   Eyes:      General: No scleral icterus.  Cardiovascular:      Rate and Rhythm: Normal rate and regular rhythm.   Pulmonary:      Effort: Pulmonary effort is normal. No respiratory distress.      Breath sounds: Normal breath sounds.   Abdominal:      General: Bowel sounds are normal. There is no distension.      Palpations: Abdomen is soft.      Tenderness: There is no abdominal tenderness. There is no guarding or rebound.   Skin:     General: Skin is warm and dry.      Coloration: Skin is not jaundiced.   Neurological:      Mental Status: She is " alert and oriented to person, place, and time.   Psychiatric:         Mood and Affect: Mood normal.         Behavior: Behavior normal.         Thought Content: Thought content normal.         Judgment: Judgment normal.           Assessment and Plan    Diagnoses and all orders for this visit:    1. Pancreatic lesion (Primary)  -     Cancel: MRI Abdomen With & Without Contrast; Future  -     Cancer Antigen 19-9  -     MRI Abdomen With & Without Contrast; Future    2. Generalized abdominal pain  -     Cancel: MRI Abdomen With & Without Contrast; Future  -     Cancer Antigen 19-9  -     MRI Abdomen With & Without Contrast; Future    3. Family history of malignant neoplasm of pancreas  -     Cancel: MRI Abdomen With & Without Contrast; Future  -     Cancer Antigen 19-9  -     MRI Abdomen With & Without Contrast; Future    4. Family history of primary malignant neoplasm of liver  -     Cancel: MRI Abdomen With & Without Contrast; Future  -     Cancer Antigen 19-9  -     MRI Abdomen With & Without Contrast; Future    5. Change in bowel habits  -     Cancel: MRI Abdomen With & Without Contrast; Future  -     Cancer Antigen 19-9  -     MRI Abdomen With & Without Contrast; Future    6. Neoplasm of uncertain behavior of other specified digestive organs  -     Cancer Antigen 19-9       Significant family history of pancreatic and liver cancer, abnormal CT scan, recommend MRI pancreatic mass protocol    Recommend blood work, orders placed for CA 19-9.    Colon cancer screening up-to-date, recommend colonoscopy June 2026.    Recommend restarting fiber supplement to help regulate bowel movements.    Additional recommendations will be made based on results of blood test and imaging.            Patient Instructions   Schedule MRI of the abdomen pancreatic protocol for further evaluation of abnormal CT scan given significant family history of pancreatic and liver cancer in first-degree relative.    Scheduling of your Ordered  Diagnostic Tests :    A team member from [facility] scheduling department will contact you to schedule your tests.    You can also contact them directly at [phone].     Proceed with blood work, orders placed for CA 19-9    Restart fiber supplement to help regulate bowel movements, keep track of bowel pattern and dose of fiber and adjust to help promote more complete and regular evacuation.    Colon cancer screening up-to-date, due for colonoscopy June 2026    Additional recommendations will be made based on results of the above work-up.        EMR Dragon/Transcription Disclaimer:  This document has been Dictated utilizing Dragon dictation.

## 2023-10-19 LAB — CANCER AG19-9 SERPL-ACNC: 4 U/ML (ref 0–35)

## 2023-11-09 ENCOUNTER — HOSPITAL ENCOUNTER (OUTPATIENT)
Dept: MRI IMAGING | Facility: HOSPITAL | Age: 68
Discharge: HOME | End: 2023-11-09
Admitting: NURSE PRACTITIONER
Payer: MEDICARE

## 2023-11-09 DIAGNOSIS — Z80.0: ICD-10-CM

## 2023-11-09 DIAGNOSIS — R19.4 CHANGE IN BOWEL HABITS: ICD-10-CM

## 2023-11-09 DIAGNOSIS — Z80.0 FAMILY HISTORY OF MALIGNANT NEOPLASM OF PANCREAS: ICD-10-CM

## 2023-11-09 DIAGNOSIS — R10.84 GENERALIZED ABDOMINAL PAIN: ICD-10-CM

## 2023-11-09 DIAGNOSIS — K86.9 PANCREATIC LESION: ICD-10-CM

## 2023-11-09 PROCEDURE — 82565 ASSAY OF CREATININE: CPT

## 2023-11-09 PROCEDURE — 0 GADOBENATE DIMEGLUMINE 529 MG/ML SOLUTION: Performed by: NURSE PRACTITIONER

## 2023-11-09 PROCEDURE — 74183 MRI ABD W/O CNTR FLWD CNTR: CPT

## 2023-11-09 PROCEDURE — A9577 INJ MULTIHANCE: HCPCS | Performed by: NURSE PRACTITIONER

## 2023-11-09 RX ADMIN — GADOBENATE DIMEGLUMINE 13 ML: 529 INJECTION, SOLUTION INTRAVENOUS at 09:38

## 2023-11-10 LAB — CREAT BLDA-MCNC: 0.6 MG/DL (ref 0.6–1.3)

## 2023-11-16 ENCOUNTER — TELEPHONE (OUTPATIENT)
Dept: GASTROENTEROLOGY | Facility: CLINIC | Age: 68
End: 2023-11-16
Payer: MEDICARE

## 2023-11-17 NOTE — TELEPHONE ENCOUNTER
Reviewed imaging results with patient via telephone.    I have contacted radiology department, spoke with radiology technician, Terrence, I have requested that CT scan October 4, 2023 be requested from [facility] radiology department so that I can discuss with radiologist Dr. Noriega regarding hepatic cirrhosis next week.  Also discussed with patient possible referral to Dr. Ventura Telles regarding family history of pancreatic and liver malignancy and any additional recommendations.  We will contact patient next week after discussion with radiologist and comparison of previous imaging.    Barney  Review of imaging.    November 9, 2023.  MRI abdomen pancreatic protocol:  Pancreatic cystic lesions measuring up to 1 x 0.8 cm in the head of the pancreas, consider 6-month follow-up if clinically indicated given patient's history  Hepatic cirrhosis  October 4, 2023 CT scan:  liver noted as unremarkable.  Multiple incidental cystic pancreatic masses  Mild pericardial calcification consistent with prior pericardial infection or inflammation  December 4, 2019 CT scan:  moderate fatty infiltration of the liver, moderate hepatic steatosis, no pancreatic mass.

## 2023-11-22 ENCOUNTER — TELEPHONE (OUTPATIENT)
Dept: GASTROENTEROLOGY | Facility: CLINIC | Age: 68
End: 2023-11-22
Payer: MEDICARE

## 2023-11-27 NOTE — TELEPHONE ENCOUNTER
Please contact patient and let her know that I have played phone tag with radiologist last week and again today.      I will try to reach out to him by the end of the day otherwise I will call first thing in the morning again.  I will be in touch with her as soon as I discussed with radiologist.  Thank you so much.Barney

## 2023-12-01 DIAGNOSIS — Z80.0 FAMILY HISTORY OF MALIGNANT NEOPLASM OF PANCREAS: ICD-10-CM

## 2023-12-01 DIAGNOSIS — K86.9 PANCREATIC LESION: Primary | ICD-10-CM

## 2023-12-01 DIAGNOSIS — K74.60 LIVER CIRRHOSIS SECONDARY TO NASH: ICD-10-CM

## 2023-12-01 DIAGNOSIS — Z80.0 FAMILY HISTORY OF LIVER CANCER: ICD-10-CM

## 2023-12-01 DIAGNOSIS — K75.81 LIVER CIRRHOSIS SECONDARY TO NASH: ICD-10-CM

## 2023-12-15 ENCOUNTER — TELEPHONE (OUTPATIENT)
Dept: GASTROENTEROLOGY | Facility: CLINIC | Age: 68
End: 2023-12-15
Payer: MEDICARE

## 2023-12-15 NOTE — TELEPHONE ENCOUNTER
Patient called stating she hasn't heard from Jac Telles' office regarding the referral sent over 12/1/23.  Spoke with  who said to give her that office number to call and schedule.  Gave patient phone number.

## 2023-12-15 NOTE — TELEPHONE ENCOUNTER
Hub staff attempted to follow warm transfer process and was unsuccessful     Caller: Stephie Gerber    Relationship to patient: Self    Best call back number: [phone]     Patient is needing: PT IS CALLING IN REGARDS TO REFERRAL DARRYL SENT TO DR. SHANE'S OFFICE. I SEE IT IN THE SYSTEM BUT DR. SHANE'S OFFICE SAYS THEY DO NOT HAVE IT. CAN WE PLEASE REFAX THE REFERRAL SO THE PT CAN GET SCHEDULED.

## 2023-12-15 NOTE — TELEPHONE ENCOUNTER
Returned patient call and told her that we refaxed the records today and that I spoke to Rosalva at  office who will call her to schedule once reviewed

## 2024-01-02 ENCOUNTER — TELEPHONE (OUTPATIENT)
Dept: GASTROENTEROLOGY | Facility: CLINIC | Age: 69
End: 2024-01-02
Payer: MEDICARE

## 2024-01-02 DIAGNOSIS — Z80.0 FAMILY HISTORY OF LIVER CANCER: ICD-10-CM

## 2024-01-02 DIAGNOSIS — K86.9 PANCREATIC LESION: Primary | ICD-10-CM

## 2024-01-02 DIAGNOSIS — Z80.0 FAMILY HISTORY OF MALIGNANT NEOPLASM OF PANCREAS: ICD-10-CM

## 2024-01-02 NOTE — TELEPHONE ENCOUNTER
Caller: Stephie Gerber    Relationship to patient: Self    Best call back number: [phone]     Patient is needing: A CALLBACK. PATIENT IS CONCERNED WITH GETTING IN TO DR. VALLEJO OFFICE RELATED TO DIAGNOSIS. PATIENT HAS HAD REFERRAL FAXED TO DR. VALLEJO OFFICE & SRIKANTH AT THEIR OFFICE IS SAYING THEY DID NOT GET ANYTHING. THIS HAS BEEN CLARIFIED & SENT 3 TIMES. PATIENT IS GETTING VERY UPSET & CONCERNED WITH TREATMENT FOR HER DIAGNOSIS. PLEASE FOLLOW UP WITH PATIENT.

## 2024-01-11 NOTE — TELEPHONE ENCOUNTER
Rebecca,     Please call patient and let her know that we heard back from Gladys with Dr Telles office regarding the referral.      He reviewed her records and recommended repeat MRCP in 6mo.     He states that it has to be 2cm or more before he will do an EUS or ErCP on her.  Her largest cystic lesion is 1 cm x 0.8 cm.    Please let her know that I have placed an order for repeat MRCP May 2024.  She can contact [facility] Scheduling [phone] if she would like to get this scheduled.  Again MRCP is due May 2024.  Please annotate Dr. Telles referral and cancel it at this time.  Thank you.    Barney

## 2024-04-20 ENCOUNTER — HOSPITAL ENCOUNTER (OUTPATIENT)
Dept: MRI IMAGING | Facility: HOSPITAL | Age: 69
Discharge: HOME | End: 2024-04-20
Payer: MEDICARE

## 2024-04-20 DIAGNOSIS — K86.9 PANCREATIC LESION: ICD-10-CM

## 2024-04-20 DIAGNOSIS — Z80.0 FAMILY HISTORY OF LIVER CANCER: ICD-10-CM

## 2024-04-20 DIAGNOSIS — Z80.0 FAMILY HISTORY OF MALIGNANT NEOPLASM OF PANCREAS: ICD-10-CM

## 2024-04-20 PROCEDURE — 82565 ASSAY OF CREATININE: CPT

## 2024-04-20 PROCEDURE — 74183 MRI ABD W/O CNTR FLWD CNTR: CPT

## 2024-04-20 PROCEDURE — A9577 INJ MULTIHANCE: HCPCS | Performed by: NURSE PRACTITIONER

## 2024-04-20 PROCEDURE — 0 GADOBENATE DIMEGLUMINE 529 MG/ML SOLUTION: Performed by: NURSE PRACTITIONER

## 2024-04-20 RX ADMIN — GADOBENATE DIMEGLUMINE 13 ML: 529 INJECTION, SOLUTION INTRAVENOUS at 13:09

## 2024-04-21 LAB — CREAT BLDA-MCNC: 0.7 MG/DL (ref 0.6–1.3)

## 2024-10-09 ENCOUNTER — APPOINTMENT (OUTPATIENT)
Dept: WOMENS IMAGING | Facility: HOSPITAL | Age: 69
End: 2024-10-09
Payer: MEDICARE

## 2024-10-09 PROCEDURE — 77067 SCR MAMMO BI INCL CAD: CPT | Performed by: RADIOLOGY

## 2024-10-09 PROCEDURE — 77063 BREAST TOMOSYNTHESIS BI: CPT | Performed by: RADIOLOGY

## 2025-04-09 ENCOUNTER — TRANSCRIBE ORDERS (OUTPATIENT)
Dept: ADMINISTRATIVE | Facility: HOSPITAL | Age: 70
End: 2025-04-09
Payer: MEDICARE

## 2025-04-09 DIAGNOSIS — E04.9 GOITER: Primary | ICD-10-CM

## 2025-08-06 ENCOUNTER — HOSPITAL ENCOUNTER (OUTPATIENT)
Dept: ULTRASOUND IMAGING | Facility: HOSPITAL | Age: 70
Discharge: HOME | End: 2025-08-06
Admitting: NURSE PRACTITIONER
Payer: MEDICARE

## 2025-08-06 DIAGNOSIS — E04.9 GOITER: ICD-10-CM

## 2025-08-06 PROCEDURE — 76536 US EXAM OF HEAD AND NECK: CPT

## (undated) DEVICE — GOWN ISOL W/THUMB UNIV BLU BX/15

## (undated) DEVICE — LASSO POLYPECTOMY SNARE: Brand: LASSO

## (undated) DEVICE — KT ORCA ORCAPOD DISP STRL

## (undated) DEVICE — GOWN ,SIRUS,NONREINFORCED 3XL: Brand: MEDLINE

## (undated) DEVICE — TRAP SPECI SUCTIONPOLYPTRAP 4/CHMBR

## (undated) DEVICE — FLEX ADVANTAGE 1500CC: Brand: FLEX ADVANTAGE

## (undated) DEVICE — Device

## (undated) DEVICE — SINGLE-USE BIOPSY FORCEPS: Brand: RADIAL JAW 4

## (undated) DEVICE — SYRINGE, LUER SLIP, STERILE, 60ML: Brand: MEDLINE

## (undated) DEVICE — VIAL FORMLN CAP 10PCT 20ML

## (undated) DEVICE — CANN NASL CO2 TRULINK W/O2 A/